# Patient Record
Sex: FEMALE | Race: WHITE | NOT HISPANIC OR LATINO
[De-identification: names, ages, dates, MRNs, and addresses within clinical notes are randomized per-mention and may not be internally consistent; named-entity substitution may affect disease eponyms.]

---

## 2019-03-12 ENCOUNTER — RESULT REVIEW (OUTPATIENT)
Age: 39
End: 2019-03-12

## 2019-03-21 ENCOUNTER — OUTPATIENT (OUTPATIENT)
Dept: OUTPATIENT SERVICES | Facility: HOSPITAL | Age: 39
LOS: 1 days | End: 2019-03-21
Payer: COMMERCIAL

## 2019-03-21 VITALS
SYSTOLIC BLOOD PRESSURE: 128 MMHG | WEIGHT: 147.27 LBS | TEMPERATURE: 98 F | HEIGHT: 66 IN | OXYGEN SATURATION: 99 % | DIASTOLIC BLOOD PRESSURE: 83 MMHG | HEART RATE: 86 BPM | RESPIRATION RATE: 16 BRPM

## 2019-03-21 DIAGNOSIS — Z98.891 HISTORY OF UTERINE SCAR FROM PREVIOUS SURGERY: Chronic | ICD-10-CM

## 2019-03-21 DIAGNOSIS — D24.1 BENIGN NEOPLASM OF RIGHT BREAST: ICD-10-CM

## 2019-03-21 DIAGNOSIS — Z98.890 OTHER SPECIFIED POSTPROCEDURAL STATES: Chronic | ICD-10-CM

## 2019-03-21 DIAGNOSIS — Z01.818 ENCOUNTER FOR OTHER PREPROCEDURAL EXAMINATION: ICD-10-CM

## 2019-03-21 DIAGNOSIS — N63.10 UNSPECIFIED LUMP IN THE RIGHT BREAST, UNSPECIFIED QUADRANT: ICD-10-CM

## 2019-03-21 PROBLEM — Z00.00 ENCOUNTER FOR PREVENTIVE HEALTH EXAMINATION: Status: ACTIVE | Noted: 2019-03-21

## 2019-03-21 LAB
ANION GAP SERPL CALC-SCNC: 7 MMOL/L — SIGNIFICANT CHANGE UP (ref 5–17)
BUN SERPL-MCNC: 11 MG/DL — SIGNIFICANT CHANGE UP (ref 7–23)
CALCIUM SERPL-MCNC: 9.4 MG/DL — SIGNIFICANT CHANGE UP (ref 8.4–10.5)
CHLORIDE SERPL-SCNC: 99 MMOL/L — SIGNIFICANT CHANGE UP (ref 96–108)
CO2 SERPL-SCNC: 24 MMOL/L — SIGNIFICANT CHANGE UP (ref 22–31)
CREAT SERPL-MCNC: 0.94 MG/DL — SIGNIFICANT CHANGE UP (ref 0.5–1.3)
GLUCOSE SERPL-MCNC: 96 MG/DL — SIGNIFICANT CHANGE UP (ref 70–99)
HCG SERPL QL: NEGATIVE — SIGNIFICANT CHANGE UP
HCT VFR BLD CALC: 36.9 % — SIGNIFICANT CHANGE UP (ref 34.5–45)
HGB BLD-MCNC: 12.8 G/DL — SIGNIFICANT CHANGE UP (ref 11.5–15.5)
MCHC RBC-ENTMCNC: 32.5 PG — SIGNIFICANT CHANGE UP (ref 27–34)
MCHC RBC-ENTMCNC: 34.7 GM/DL — SIGNIFICANT CHANGE UP (ref 32–36)
MCV RBC AUTO: 93.6 FL — SIGNIFICANT CHANGE UP (ref 80–100)
PLATELET # BLD AUTO: 420 K/UL — HIGH (ref 150–400)
POTASSIUM SERPL-MCNC: 4.5 MMOL/L — SIGNIFICANT CHANGE UP (ref 3.5–5.3)
POTASSIUM SERPL-SCNC: 4.5 MMOL/L — SIGNIFICANT CHANGE UP (ref 3.5–5.3)
RBC # BLD: 3.94 M/UL — SIGNIFICANT CHANGE UP (ref 3.8–5.2)
RBC # FLD: 11.1 % — SIGNIFICANT CHANGE UP (ref 10.3–14.5)
SODIUM SERPL-SCNC: 130 MMOL/L — LOW (ref 135–145)
WBC # BLD: 6 K/UL — SIGNIFICANT CHANGE UP (ref 3.8–10.5)
WBC # FLD AUTO: 6 K/UL — SIGNIFICANT CHANGE UP (ref 3.8–10.5)

## 2019-03-21 PROCEDURE — 84703 CHORIONIC GONADOTROPIN ASSAY: CPT

## 2019-03-21 PROCEDURE — 93010 ELECTROCARDIOGRAM REPORT: CPT | Mod: NC

## 2019-03-21 PROCEDURE — 85027 COMPLETE CBC AUTOMATED: CPT

## 2019-03-21 PROCEDURE — 93005 ELECTROCARDIOGRAM TRACING: CPT

## 2019-03-21 PROCEDURE — G0463: CPT

## 2019-03-21 PROCEDURE — 36415 COLL VENOUS BLD VENIPUNCTURE: CPT

## 2019-03-21 PROCEDURE — 80048 BASIC METABOLIC PNL TOTAL CA: CPT

## 2019-03-21 NOTE — H&P PST ADULT - ATTENDING COMMENTS
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  She now presents to undergo a right fadia  localization breast biopsy.

## 2019-03-21 NOTE — H&P PST ADULT - RS GEN PE MLT RESP DETAILS PC
no wheezes/breath sounds equal/good air movement/no rhonchi/respirations non-labored/airway patent/clear to auscultation bilaterally/no rales

## 2019-03-21 NOTE — H&P PST ADULT - HISTORY OF PRESENT ILLNESS
39 yo female presents s/p right breast biopsy in February which was benign. Now for a right fadia  localization breast biopsy. Denies breast pain.

## 2019-03-21 NOTE — H&P PST ADULT - NSICDXPASTSURGICALHX_GEN_ALL_CORE_FT
PAST SURGICAL HISTORY:  S/P cervical polypectomy uterine polyp     S/P  section     S/P laparoscopy  for fertility issues

## 2019-03-21 NOTE — H&P PST ADULT - NSANTHOSAYNRD_GEN_A_CORE
No. TAMMI screening performed.  STOP BANG Legend: 0-2 = LOW Risk; 3-4 = INTERMEDIATE Risk; 5-8 = HIGH Risk/neck 12.75 inches

## 2019-03-21 NOTE — H&P PST ADULT - NSICDXFAMILYHX_GEN_ALL_CORE_FT
FAMILY HISTORY:  Father  Still living? Yes, Estimated age: 61-70  FH: type 2 diabetes mellitus, Age at diagnosis: Age Unknown  FHx: hypertension, Age at diagnosis: Age Unknown    Mother  Still living? Yes, Estimated age: 61-70  Family history of autoimmune disorder, Age at diagnosis: Age Unknown  FHx: atrial fibrillation, Age at diagnosis: Age Unknown

## 2019-03-21 NOTE — H&P PST ADULT - NSICDXPASTMEDICALHX_GEN_ALL_CORE_FT
PAST MEDICAL HISTORY:  Abdominal pain GI,  work up has not revealed anything    Anxiety     Breast mass, right     Gastritis     GERD (gastroesophageal reflux disease)     Hernia, hiatal     History of chronic fatigue     History of gluten intolerance     History of right bundle branch block (RBBB)     Microscopic hematuria     Nocturia x2    Sinus infection treated with z pack which was completed 3/19/19    Thyroid disease worked up currently by endocrinologist, ultrasound revealed thyroiditis but blood work was normal

## 2019-03-21 NOTE — H&P PST ADULT - NSICDXPROBLEM_GEN_ALL_CORE_FT
PROBLEM DIAGNOSES  Problem: Mass of breast, right  Assessment and Plan: right fadia  localization. medical clearance requested. ranitidine and nexium AM of surgery with sips of water. May take xanax. Dr. Palleschi's the patient's disc for the FADIA  and it will be uploaded to [facility].

## 2019-03-22 ENCOUNTER — OUTPATIENT (OUTPATIENT)
Dept: OUTPATIENT SERVICES | Facility: HOSPITAL | Age: 39
LOS: 1 days | End: 2019-03-22
Payer: COMMERCIAL

## 2019-03-22 ENCOUNTER — APPOINTMENT (OUTPATIENT)
Dept: ULTRASOUND IMAGING | Facility: IMAGING CENTER | Age: 39
End: 2019-03-22
Payer: COMMERCIAL

## 2019-03-22 DIAGNOSIS — Z98.891 HISTORY OF UTERINE SCAR FROM PREVIOUS SURGERY: Chronic | ICD-10-CM

## 2019-03-22 DIAGNOSIS — Z00.8 ENCOUNTER FOR OTHER GENERAL EXAMINATION: ICD-10-CM

## 2019-03-22 DIAGNOSIS — Z98.890 OTHER SPECIFIED POSTPROCEDURAL STATES: Chronic | ICD-10-CM

## 2019-03-22 PROBLEM — J32.9 CHRONIC SINUSITIS, UNSPECIFIED: Chronic | Status: ACTIVE | Noted: 2019-03-21

## 2019-03-22 PROBLEM — E07.9 DISORDER OF THYROID, UNSPECIFIED: Chronic | Status: ACTIVE | Noted: 2019-03-21

## 2019-03-22 PROBLEM — R35.1 NOCTURIA: Chronic | Status: ACTIVE | Noted: 2019-03-21

## 2019-03-22 PROBLEM — R10.9 UNSPECIFIED ABDOMINAL PAIN: Chronic | Status: ACTIVE | Noted: 2019-03-21

## 2019-03-22 PROCEDURE — C1739: CPT

## 2019-03-22 PROCEDURE — 19285 PERQ DEV BREAST 1ST US IMAG: CPT

## 2019-03-22 PROCEDURE — 19285 PERQ DEV BREAST 1ST US IMAG: CPT | Mod: RT

## 2019-03-26 ENCOUNTER — APPOINTMENT (OUTPATIENT)
Dept: ULTRASOUND IMAGING | Facility: CLINIC | Age: 39
End: 2019-03-26

## 2019-03-27 ENCOUNTER — TRANSCRIPTION ENCOUNTER (OUTPATIENT)
Age: 39
End: 2019-03-27

## 2019-03-28 ENCOUNTER — RESULT REVIEW (OUTPATIENT)
Age: 39
End: 2019-03-28

## 2019-03-28 ENCOUNTER — OUTPATIENT (OUTPATIENT)
Dept: OUTPATIENT SERVICES | Facility: HOSPITAL | Age: 39
LOS: 1 days | End: 2019-03-28
Payer: COMMERCIAL

## 2019-03-28 VITALS
WEIGHT: 147.27 LBS | HEIGHT: 66 IN | RESPIRATION RATE: 18 BRPM | HEART RATE: 75 BPM | DIASTOLIC BLOOD PRESSURE: 77 MMHG | OXYGEN SATURATION: 100 % | TEMPERATURE: 98 F | SYSTOLIC BLOOD PRESSURE: 125 MMHG

## 2019-03-28 VITALS
OXYGEN SATURATION: 100 % | RESPIRATION RATE: 16 BRPM | SYSTOLIC BLOOD PRESSURE: 122 MMHG | DIASTOLIC BLOOD PRESSURE: 75 MMHG | TEMPERATURE: 98 F | HEART RATE: 76 BPM

## 2019-03-28 DIAGNOSIS — Z01.818 ENCOUNTER FOR OTHER PREPROCEDURAL EXAMINATION: ICD-10-CM

## 2019-03-28 DIAGNOSIS — Z98.890 OTHER SPECIFIED POSTPROCEDURAL STATES: Chronic | ICD-10-CM

## 2019-03-28 DIAGNOSIS — Z98.891 HISTORY OF UTERINE SCAR FROM PREVIOUS SURGERY: Chronic | ICD-10-CM

## 2019-03-28 DIAGNOSIS — D24.1 BENIGN NEOPLASM OF RIGHT BREAST: ICD-10-CM

## 2019-03-28 PROCEDURE — 88307 TISSUE EXAM BY PATHOLOGIST: CPT

## 2019-03-28 PROCEDURE — 19125 EXCISION BREAST LESION: CPT | Mod: RT

## 2019-03-28 PROCEDURE — 88307 TISSUE EXAM BY PATHOLOGIST: CPT | Mod: 26

## 2019-03-28 PROCEDURE — ZZZZZ: CPT

## 2019-03-28 PROCEDURE — 76098 X-RAY EXAM SURGICAL SPECIMEN: CPT

## 2019-03-28 PROCEDURE — 76098 X-RAY EXAM SURGICAL SPECIMEN: CPT | Mod: 26

## 2019-03-28 RX ORDER — PREGABALIN 225 MG/1
1 CAPSULE ORAL
Qty: 0 | Refills: 0 | COMMUNITY

## 2019-03-28 RX ORDER — ALPRAZOLAM 0.25 MG
1 TABLET ORAL
Qty: 0 | Refills: 0 | COMMUNITY

## 2019-03-28 RX ORDER — OXYCODONE HYDROCHLORIDE 5 MG/1
5 TABLET ORAL EVERY 4 HOURS
Qty: 0 | Refills: 0 | Status: DISCONTINUED | OUTPATIENT
Start: 2019-03-28 | End: 2019-03-28

## 2019-03-28 RX ORDER — HYDROMORPHONE HYDROCHLORIDE 2 MG/ML
1 INJECTION INTRAMUSCULAR; INTRAVENOUS; SUBCUTANEOUS
Qty: 0 | Refills: 0 | Status: DISCONTINUED | OUTPATIENT
Start: 2019-03-28 | End: 2019-03-28

## 2019-03-28 RX ORDER — OXYBUTYNIN CHLORIDE 5 MG
1 TABLET ORAL
Qty: 0 | Refills: 0 | COMMUNITY

## 2019-03-28 RX ORDER — ESOMEPRAZOLE MAGNESIUM 40 MG/1
1 CAPSULE, DELAYED RELEASE ORAL
Qty: 0 | Refills: 0 | COMMUNITY

## 2019-03-28 RX ORDER — SODIUM CHLORIDE 9 MG/ML
1000 INJECTION, SOLUTION INTRAVENOUS
Qty: 0 | Refills: 0 | Status: DISCONTINUED | OUTPATIENT
Start: 2019-03-28 | End: 2019-03-28

## 2019-03-28 RX ORDER — CHOLECALCIFEROL (VITAMIN D3) 125 MCG
1 CAPSULE ORAL
Qty: 0 | Refills: 0 | COMMUNITY

## 2019-03-28 RX ORDER — ONDANSETRON 8 MG/1
4 TABLET, FILM COATED ORAL EVERY 6 HOURS
Qty: 0 | Refills: 0 | Status: DISCONTINUED | OUTPATIENT
Start: 2019-03-28 | End: 2019-04-12

## 2019-03-28 RX ORDER — RANITIDINE HYDROCHLORIDE 150 MG/1
1 TABLET, FILM COATED ORAL
Qty: 0 | Refills: 0 | COMMUNITY

## 2019-03-28 RX ORDER — ACETAMINOPHEN 500 MG
0 TABLET ORAL
Qty: 0 | Refills: 0 | COMMUNITY

## 2019-03-28 RX ORDER — ONDANSETRON 8 MG/1
4 TABLET, FILM COATED ORAL ONCE
Qty: 0 | Refills: 0 | Status: DISCONTINUED | OUTPATIENT
Start: 2019-03-28 | End: 2019-03-28

## 2019-03-28 RX ORDER — HYDROMORPHONE HYDROCHLORIDE 2 MG/ML
0.5 INJECTION INTRAMUSCULAR; INTRAVENOUS; SUBCUTANEOUS
Qty: 0 | Refills: 0 | Status: DISCONTINUED | OUTPATIENT
Start: 2019-03-28 | End: 2019-03-28

## 2019-03-28 RX ADMIN — SODIUM CHLORIDE 50 MILLILITER(S): 9 INJECTION, SOLUTION INTRAVENOUS at 10:23

## 2019-03-28 RX ADMIN — OXYCODONE HYDROCHLORIDE 5 MILLIGRAM(S): 5 TABLET ORAL at 14:15

## 2019-03-28 RX ADMIN — OXYCODONE HYDROCHLORIDE 5 MILLIGRAM(S): 5 TABLET ORAL at 13:45

## 2019-03-28 NOTE — BRIEF OPERATIVE NOTE - NSICDXBRIEFPROCEDURE_GEN_ALL_CORE_FT
PROCEDURES:  Excisional breast biopsy 28-Mar-2019 12:13:08 right 9:00 with fadia  localization Palleschi, Susan M

## 2019-03-28 NOTE — BRIEF OPERATIVE NOTE - NSICDXBRIEFPREOP_GEN_ALL_CORE_FT
PRE-OP DIAGNOSIS:  Breast mass, right 28-Mar-2019 12:13:57 9:00 with core biopsy fibroepithelial lesion Palleschi, Susan M

## 2019-03-28 NOTE — ASU DISCHARGE PLAN (ADULT/PEDIATRIC) - ASU DC SPECIAL INSTRUCTIONSFT
Follow-up Dr. Palleschi in 10 days; call office for appointment.  Wear compression bra @ all times x 48 hours (including sleep), thereafter for comfort.

## 2019-03-28 NOTE — ASU DISCHARGE PLAN (ADULT/PEDIATRIC) - CALL YOUR DOCTOR IF YOU HAVE ANY OF THE FOLLOWING:
Nausea and vomiting that does not stop/Unable to urinate/Inability to tolerate liquids or foods/Pain not relieved by Medications/Wound/Surgical Site with redness, or foul smelling discharge or pus/Bleeding that does not stop/Fever greater than (need to indicate Fahrenheit or Celsius)

## 2019-03-28 NOTE — ASU PATIENT PROFILE, ADULT - PMH
Abdominal pain  GI,  work up has not revealed anything  Anxiety    Breast mass, right    Gastritis    GERD (gastroesophageal reflux disease)    Hernia, hiatal    History of chronic fatigue    History of gluten intolerance    History of right bundle branch block (RBBB)    Microscopic hematuria    Nocturia  x2  Sinus infection  treated with z pack which was completed 3/19/19  Thyroid disease  worked up currently by endocrinologist, ultrasound revealed thyroiditis but blood work was normal

## 2019-03-28 NOTE — ASU DISCHARGE PLAN (ADULT/PEDIATRIC) - CARE PROVIDER_API CALL
Palleschi, Susan M (MD)  Surgery  [address], [address]  [place] [address]  Phone: [phone]  Fax: [phone]  Follow Up Time:

## 2019-03-28 NOTE — BRIEF OPERATIVE NOTE - NSICDXBRIEFPOSTOP_GEN_ALL_CORE_FT
POST-OP DIAGNOSIS:  Mass of breast, right 28-Mar-2019 12:14:28 9:00 with core biopsy fibroepithelial lesion Palleschi, Susan M

## 2019-04-04 LAB — SURGICAL PATHOLOGY STUDY: SIGNIFICANT CHANGE UP

## 2019-08-23 PROBLEM — Z87.898 PERSONAL HISTORY OF OTHER SPECIFIED CONDITIONS: Chronic | Status: ACTIVE | Noted: 2019-03-21

## 2019-08-23 PROBLEM — Z87.19 PERSONAL HISTORY OF OTHER DISEASES OF THE DIGESTIVE SYSTEM: Chronic | Status: ACTIVE | Noted: 2019-03-21

## 2019-08-23 PROBLEM — K21.9 GASTRO-ESOPHAGEAL REFLUX DISEASE WITHOUT ESOPHAGITIS: Chronic | Status: ACTIVE | Noted: 2019-03-21

## 2019-08-23 PROBLEM — Z86.79 PERSONAL HISTORY OF OTHER DISEASES OF THE CIRCULATORY SYSTEM: Chronic | Status: ACTIVE | Noted: 2019-03-21

## 2019-08-23 PROBLEM — F41.9 ANXIETY DISORDER, UNSPECIFIED: Chronic | Status: ACTIVE | Noted: 2019-03-21

## 2019-08-23 PROBLEM — R31.29 OTHER MICROSCOPIC HEMATURIA: Chronic | Status: ACTIVE | Noted: 2019-03-21

## 2019-08-23 PROBLEM — N63.10 UNSPECIFIED LUMP IN THE RIGHT BREAST, UNSPECIFIED QUADRANT: Chronic | Status: ACTIVE | Noted: 2019-03-21

## 2019-08-23 PROBLEM — K29.70 GASTRITIS, UNSPECIFIED, WITHOUT BLEEDING: Chronic | Status: ACTIVE | Noted: 2019-03-21

## 2019-08-23 PROBLEM — K44.9 DIAPHRAGMATIC HERNIA WITHOUT OBSTRUCTION OR GANGRENE: Chronic | Status: ACTIVE | Noted: 2019-03-21

## 2019-08-29 ENCOUNTER — APPOINTMENT (OUTPATIENT)
Dept: NEUROLOGY | Facility: CLINIC | Age: 39
End: 2019-08-29

## 2020-07-17 ENCOUNTER — APPOINTMENT (OUTPATIENT)
Dept: RHEUMATOLOGY | Facility: CLINIC | Age: 40
End: 2020-07-17

## 2020-07-26 ENCOUNTER — TRANSCRIPTION ENCOUNTER (OUTPATIENT)
Age: 40
End: 2020-07-26

## 2020-08-03 ENCOUNTER — LABORATORY RESULT (OUTPATIENT)
Age: 40
End: 2020-08-03

## 2020-08-03 ENCOUNTER — APPOINTMENT (OUTPATIENT)
Dept: RHEUMATOLOGY | Facility: CLINIC | Age: 40
End: 2020-08-03

## 2020-08-03 ENCOUNTER — APPOINTMENT (OUTPATIENT)
Dept: RHEUMATOLOGY | Facility: CLINIC | Age: 40
End: 2020-08-03
Payer: COMMERCIAL

## 2020-08-03 VITALS
WEIGHT: 152 LBS | OXYGEN SATURATION: 97 % | BODY MASS INDEX: 24.43 KG/M2 | SYSTOLIC BLOOD PRESSURE: 111 MMHG | TEMPERATURE: 98 F | HEIGHT: 66 IN | DIASTOLIC BLOOD PRESSURE: 71 MMHG | HEART RATE: 81 BPM

## 2020-08-03 DIAGNOSIS — R22.1 LOCALIZED SWELLING, MASS AND LUMP, HEAD: ICD-10-CM

## 2020-08-03 DIAGNOSIS — Z86.59 PERSONAL HISTORY OF OTHER MENTAL AND BEHAVIORAL DISORDERS: ICD-10-CM

## 2020-08-03 DIAGNOSIS — R22.0 LOCALIZED SWELLING, MASS AND LUMP, HEAD: ICD-10-CM

## 2020-08-03 DIAGNOSIS — Z78.9 OTHER SPECIFIED HEALTH STATUS: ICD-10-CM

## 2020-08-03 DIAGNOSIS — F17.200 NICOTINE DEPENDENCE, UNSPECIFIED, UNCOMPLICATED: ICD-10-CM

## 2020-08-03 DIAGNOSIS — Z87.39 PERSONAL HISTORY OF OTHER DISEASES OF THE MUSCULOSKELETAL SYSTEM AND CONNECTIVE TISSUE: ICD-10-CM

## 2020-08-03 PROCEDURE — 99205 OFFICE O/P NEW HI 60 MIN: CPT

## 2020-08-04 LAB
25(OH)D3 SERPL-MCNC: 35.7 NG/ML
ALBUMIN SERPL ELPH-MCNC: 5 G/DL
ALP BLD-CCNC: 45 U/L
ALT SERPL-CCNC: 11 U/L
ANA SER IF-ACNC: NEGATIVE
ANION GAP SERPL CALC-SCNC: 16 MMOL/L
AST SERPL-CCNC: 14 U/L
BASOPHILS # BLD AUTO: 0.06 K/UL
BASOPHILS NFR BLD AUTO: 0.7 %
BILIRUB SERPL-MCNC: 0.3 MG/DL
BUN SERPL-MCNC: 11 MG/DL
C3 SERPL-MCNC: 102 MG/DL
C4 SERPL-MCNC: 21 MG/DL
CALCIUM SERPL-MCNC: 9.8 MG/DL
CHLORIDE SERPL-SCNC: 100 MMOL/L
CK SERPL-CCNC: 76 U/L
CO2 SERPL-SCNC: 22 MMOL/L
CREAT SERPL-MCNC: 1.05 MG/DL
CRP SERPL-MCNC: <0.1 MG/DL
DSDNA AB SER-ACNC: <12 IU/ML
ENA RNP AB SER IA-ACNC: 0.2 AL
ENA SM AB SER IA-ACNC: <0.2 AL
ENA SS-A AB SER IA-ACNC: <0.2 AL
ENA SS-B AB SER IA-ACNC: <0.2 AL
EOSINOPHIL # BLD AUTO: 0.08 K/UL
EOSINOPHIL NFR BLD AUTO: 1 %
ERYTHROCYTE [SEDIMENTATION RATE] IN BLOOD BY WESTERGREN METHOD: 6 MM/HR
GLUCOSE SERPL-MCNC: 84 MG/DL
HCT VFR BLD CALC: 37.8 %
HGB BLD-MCNC: 12.3 G/DL
IMM GRANULOCYTES NFR BLD AUTO: 0.1 %
LUPUS ANTICOAGULANT CASCADE REFLEX: NORMAL
LYMPHOCYTES # BLD AUTO: 2.14 K/UL
LYMPHOCYTES NFR BLD AUTO: 25.9 %
MAN DIFF?: NORMAL
MCHC RBC-ENTMCNC: 31.3 PG
MCHC RBC-ENTMCNC: 32.5 GM/DL
MCV RBC AUTO: 96.2 FL
MONOCYTES # BLD AUTO: 0.73 K/UL
MONOCYTES NFR BLD AUTO: 8.8 %
MPO AB + PR3 PNL SER: NORMAL
NEUTROPHILS # BLD AUTO: 5.25 K/UL
NEUTROPHILS NFR BLD AUTO: 63.5 %
PLATELET # BLD AUTO: 437 K/UL
POTASSIUM SERPL-SCNC: 4.8 MMOL/L
PROT SERPL-MCNC: 7.2 G/DL
RBC # BLD: 3.93 M/UL
RBC # FLD: 12.7 %
RIBOSOMAL P AB SER IA-ACNC: <0.2 AL
SODIUM SERPL-SCNC: 138 MMOL/L
TSH SERPL-ACNC: 1.1 UIU/ML
WBC # FLD AUTO: 8.27 K/UL

## 2020-08-05 NOTE — ASSESSMENT
[FreeTextEntry1] : fm tp \par tearful \par dpressed and anxious \par doesn’t go ou tqol \par fhyx ctd\par labs including avise CTD\par pulmonary nodule s- anca, ACE\par \par

## 2020-08-06 LAB
ACE BLD-CCNC: 55 U/L
BAKER'S YEAST AB QL: 15.6 UNITS
BAKER'S YEAST IGA QL IA: <5 UNITS
BAKER'S YEAST IGA QN IA: NEGATIVE
BAKER'S YEAST IGG QN IA: NEGATIVE

## 2020-08-07 PROBLEM — F17.200 CURRENT SMOKER: Status: ACTIVE | Noted: 2020-08-07

## 2020-08-07 PROBLEM — Z86.59 HISTORY OF ANXIETY: Status: RESOLVED | Noted: 2020-08-03 | Resolved: 2020-08-07

## 2020-08-07 PROBLEM — Z87.39 HISTORY OF BACK PAIN: Status: RESOLVED | Noted: 2020-08-03 | Resolved: 2020-08-07

## 2020-08-07 PROBLEM — R22.0 SWELLING, MASS, OR LUMP IN HEAD AND NECK: Status: ACTIVE | Noted: 2020-08-07

## 2020-08-07 PROBLEM — Z78.9 DOES NOT USE ILLICIT DRUGS: Status: ACTIVE | Noted: 2020-08-07

## 2020-08-08 LAB
CELIAC DISEASE INTERPRETATION: NORMAL
CELIAC GENE PAIRS PRESENT: NO
DQ ALPHA 1: NORMAL
DQ BETA 1: NORMAL
HLA-B27 RELATED AG QL: NORMAL
IMMUNOGLOBULIN A (IGA): 147 MG/DL

## 2020-08-12 NOTE — CONSULT LETTER
[Courtesy Letter:] : I had the pleasure of seeing your patient, [unfilled], in my office today. [Dear  ___] : Dear  [unfilled], [Please see my note below.] : Please see my note below. [Consult Closing:] : Thank you very much for allowing me to participate in the care of this patient.  If you have any questions, please do not hesitate to contact me. [Sincerely,] : Sincerely, [FreeTextEntry2] : Dr. Stanford

## 2020-08-12 NOTE — PHYSICAL EXAM
[General Appearance - Alert] : alert [General Appearance - Well Developed] : well developed [General Appearance - Well Nourished] : well nourished [General Appearance - In No Acute Distress] : in no acute distress [General Appearance - Well-Appearing] : healthy appearing [Sclera] : the sclera and conjunctiva were normal [Outer Ear] : the ears and nose were normal in appearance [Oropharynx] : the oropharynx was normal [Neck Cervical Mass (___cm)] : no neck mass was observed [Neck Appearance] : the appearance of the neck was normal [Jugular Venous Distention Increased] : there was no jugular-venous distention [Thyroid Diffuse Enlargement] : the thyroid was not enlarged [Thyroid Nodule] : there were no palpable thyroid nodules [Auscultation Breath Sounds / Voice Sounds] : lungs were clear to auscultation bilaterally [Heart Sounds] : normal S1 and S2 [Heart Rate And Rhythm] : heart rate was normal and rhythm regular [Murmurs] : no murmurs [Heart Sounds Gallop] : no gallops [Heart Sounds Pericardial Friction Rub] : no pericardial rub [Veins - Varicosity Changes] : there were no varicosital changes [Edema] : there was no peripheral edema [Cervical Lymph Nodes Enlarged Posterior Bilaterally] : posterior cervical [Cervical Lymph Nodes Enlarged Anterior Bilaterally] : anterior cervical [Supraclavicular Lymph Nodes Enlarged Bilaterally] : supraclavicular [No CVA Tenderness] : no ~M costovertebral angle tenderness [No Spinal Tenderness] : no spinal tenderness [Nail Clubbing] : no clubbing  or cyanosis of the fingernails [Abnormal Walk] : normal gait [Musculoskeletal - Swelling] : no joint swelling seen [Motor Tone] : muscle strength and tone were normal [Skin Color & Pigmentation] : normal skin color and pigmentation [Skin Turgor] : normal skin turgor [] : no rash [Skin Lesions] : no skin lesions [Motor Exam] : the motor exam was normal [No Focal Deficits] : no focal deficits [Oriented To Time, Place, And Person] : oriented to person, place, and time [Impaired Insight] : insight and judgment were intact [Memory Remote] : remote memory was not impaired [FreeTextEntry1] : mood is depressed, tearful at times, affect down and tearful [Memory Recent] : recent memory was not impaired

## 2020-08-12 NOTE — ASSESSMENT
[FreeTextEntry1] : 38 yo woman PMHX IBS, Depression/Anxiety here with chronic non-inflammatory myalgias and arthralgias, non-restorative sleep, fatigue and chronic pain severely affecting QOL\par \par #Myalgias/arthralgias, chronic non-inflammatory.  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  \par - rec psyche / behavioral health\par \par #neck swelling - no palpable lymph nodes ? muscular\par - US neck\par

## 2020-08-12 NOTE — HISTORY OF PRESENT ILLNESS
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 She was swimming and swallowed water, that did have red (algae?) in it.  Her stomach began to not feel well, developed diarrhea (10x/day) and anxiety.   Since that time she has experienced a number of symptoms and has been to multiple specialists including rheum, gi, infectious without any findings.  \par \par In terms of GI - IBS/HH/GERD.  Celiac workup negative.  IBD negative though one of the markers was positive (ANCA or ASCA) not sure which one.  on nexium.  Has had multiple stool samples and no infections. \par \par In terms of MSK - Hurts all over with associated fatigue and insomnia (sleep study was okay).  Awakens un refreshed.   Pain is all over, though back particularly problematic.  Has herniated disks, now s/p 3 injections.  Hs tight muscles, now s/p multiple course of muscle relaxants. \par - denies swelling, hyperflexibility, heat, redness\par - muscle - low back - upper shoulders, traps, chest wall (CT have been okay) \par - tried elavil - antidepressants - Cymbalta lowest dose completely dizzi and zonked - gluten free diet\par - celiac disease workup negative\par - now on tizanidine - a bit - \par - never on Lyrica or gabapentin\par - was on Savella\par - cant tolerate any of them\par - on and off turmeric + pepper\par - had a PT evaluation - stretches every day - \par - hurt self doing yoga\par - works three days a week - stays home because o f pain \par - nausea in the am \par - denies personal hx psoriasis, infectious diarrhea, STD, IBD \par - HPV 10 years ago - colposcopy \par - occasionally eye dry but make tears - \par   [Malaise] : malaise [Weight Loss] : no weight loss [Fever] : no fever [Depression] : depression [Malar Facial Rash] : no malar facial rash [Oral Ulcers] : no oral ulcers [Dry Mouth] : no dry mouth [Shortness of Breath] : no shortness of breath [Arthralgias] : arthralgias [Difficulty Standing] : no difficulty standing [Eye Redness] : no eye redness [Eye Pain] : no eye pain [Difficulty Walking] : no difficulty walking [Dry Eyes] : no dry eyes [None] : The patient is currently asymptomatic

## 2020-08-12 NOTE — ASSESSMENT
[FreeTextEntry1] : 40 yo woman PMHX IBS, Depression/Anxiety here with chronic non-inflammatory myalgias and arthralgias, non-restorative sleep, fatigue and chronic pain severely affecting QOL\par \par #Myalgias/arthralgias, chronic non-inflammatory.  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  \par - rec psyche / behavioral health\par \par #neck swelling - no palpable lymph nodes ? muscular\par - US neck\par

## 2020-08-12 NOTE — HISTORY OF PRESENT ILLNESS
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 She was swimming and swallowed water, that did have red (algae?) in it.  Her stomach began to not feel well, developed diarrhea (10x/day) and anxiety.   Since that time she has experienced a number of symptoms and has been to multiple specialists including rheum, gi, infectious without any findings.  \par \par In terms of GI - IBS/HH/GERD.  Celiac workup negative.  IBD negative though one of the markers was positive (ANCA or ASCA) not sure which one.  on nexium.  Has had multiple stool samples and no infections. \par \par In terms of MSK - Hurts all over with associated fatigue and insomnia (sleep study was okay).  Awakens un refreshed.   Pain is all over, though back particularly problematic.  Has herniated disks, now s/p 3 injections.  Hs tight muscles, now s/p multiple course of muscle relaxants. \par - denies swelling, hyperflexibility, heat, redness\par - muscle - low back - upper shoulders, traps, chest wall (CT have been okay) \par - tried elavil - antidepressants - Cymbalta lowest dose completely dizzi and zonked - gluten free diet\par - celiac disease workup negative\par - now on tizanidine - a bit - \par - never on Lyrica or gabapentin\par - was on Savella\par - cant tolerate any of them\par - on and off turmeric + pepper\par - had a PT evaluation - stretches every day - \par - hurt self doing yoga\par - works three days a week - stays home because o f pain \par - nausea in the am \par - denies personal hx psoriasis, infectious diarrhea, STD, IBD \par - HPV 10 years ago - colposcopy \par - occasionally eye dry but make tears - \par   [Anorexia] : no anorexia [Malaise] : malaise [Weight Loss] : no weight loss [Fever] : no fever [Depression] : depression [Oral Ulcers] : no oral ulcers [Malar Facial Rash] : no malar facial rash [Dry Mouth] : no dry mouth [Shortness of Breath] : no shortness of breath [Arthralgias] : arthralgias [Difficulty Standing] : no difficulty standing [Eye Redness] : no eye redness [Eye Pain] : no eye pain [Difficulty Walking] : no difficulty walking [Dry Eyes] : no dry eyes [None] : The patient is currently asymptomatic

## 2020-08-12 NOTE — REVIEW OF SYSTEMS
[Fever] : no fever [Feeling Poorly] : feeling poorly [Feeling Tired] : feeling tired [As Noted in HPI] : as noted in HPI [Negative] : Endocrine

## 2020-08-12 NOTE — PHYSICAL EXAM
[General Appearance - Alert] : alert [General Appearance - Well Nourished] : well nourished [General Appearance - Well Developed] : well developed [General Appearance - In No Acute Distress] : in no acute distress [General Appearance - Well-Appearing] : healthy appearing [Sclera] : the sclera and conjunctiva were normal [Oropharynx] : the oropharynx was normal [Outer Ear] : the ears and nose were normal in appearance [Neck Cervical Mass (___cm)] : no neck mass was observed [Neck Appearance] : the appearance of the neck was normal [Jugular Venous Distention Increased] : there was no jugular-venous distention [Thyroid Diffuse Enlargement] : the thyroid was not enlarged [Thyroid Nodule] : there were no palpable thyroid nodules [Auscultation Breath Sounds / Voice Sounds] : lungs were clear to auscultation bilaterally [Heart Rate And Rhythm] : heart rate was normal and rhythm regular [Heart Sounds] : normal S1 and S2 [Heart Sounds Gallop] : no gallops [Murmurs] : no murmurs [Heart Sounds Pericardial Friction Rub] : no pericardial rub [Veins - Varicosity Changes] : there were no varicosital changes [Edema] : there was no peripheral edema [Cervical Lymph Nodes Enlarged Posterior Bilaterally] : posterior cervical [Cervical Lymph Nodes Enlarged Anterior Bilaterally] : anterior cervical [Supraclavicular Lymph Nodes Enlarged Bilaterally] : supraclavicular [No CVA Tenderness] : no ~M costovertebral angle tenderness [No Spinal Tenderness] : no spinal tenderness [Abnormal Walk] : normal gait [Nail Clubbing] : no clubbing  or cyanosis of the fingernails [Musculoskeletal - Swelling] : no joint swelling seen [Motor Tone] : muscle strength and tone were normal [Skin Color & Pigmentation] : normal skin color and pigmentation [Skin Turgor] : normal skin turgor [] : no rash [Skin Lesions] : no skin lesions [Motor Exam] : the motor exam was normal [No Focal Deficits] : no focal deficits [Oriented To Time, Place, And Person] : oriented to person, place, and time [Impaired Insight] : insight and judgment were intact [Memory Remote] : remote memory was not impaired [Memory Recent] : recent memory was not impaired [FreeTextEntry1] : mood is depressed, tearful at times, affect down and tearful

## 2020-08-12 NOTE — CONSULT LETTER
[Dear  ___] : Dear  [unfilled], [Courtesy Letter:] : I had the pleasure of seeing your patient, [unfilled], in my office today. [Please see my note below.] : Please see my note below. [Consult Closing:] : Thank you very much for allowing me to participate in the care of this patient.  If you have any questions, please do not hesitate to contact me. [Sincerely,] : Sincerely, [FreeTextEntry2] : Dr. Stanford

## 2020-08-14 LAB
MISCELLANEOUS TEST: NORMAL
PROC NAME: NORMAL

## 2020-09-01 ENCOUNTER — APPOINTMENT (OUTPATIENT)
Dept: RHEUMATOLOGY | Facility: CLINIC | Age: 40
End: 2020-09-01
Payer: COMMERCIAL

## 2020-09-01 VITALS
BODY MASS INDEX: 24.75 KG/M2 | SYSTOLIC BLOOD PRESSURE: 124 MMHG | OXYGEN SATURATION: 96 % | WEIGHT: 154 LBS | HEART RATE: 87 BPM | HEIGHT: 66 IN | DIASTOLIC BLOOD PRESSURE: 85 MMHG

## 2020-09-01 LAB
CA VI IGA AB: 4.4 EU/ML
CA VI IGG AB: 6.4 EU/ML
CA VI IGM AB: 2.3 EU/ML
PSP IGA AB: 8.2 EU/ML
PSP IGG AB: 12.3 EU/ML
PSP IGM AB: 1.5 EU/ML
SEROLOGY COMMENTS: NORMAL
SP-1 IGA AB: 3.5 EU/ML
SP-1 IGG AB: 11.4 EU/ML
SP-1 IGM AB: 2.2 EU/ML

## 2020-09-01 PROCEDURE — 99214 OFFICE O/P EST MOD 30 MIN: CPT

## 2020-09-01 NOTE — REVIEW OF SYSTEMS
[Feeling Poorly] : feeling poorly [Feeling Tired] : feeling tired [As Noted in HPI] : as noted in HPI [Negative] : Endocrine [Fever] : no fever

## 2020-09-01 NOTE — HISTORY OF PRESENT ILLNESS
[Malaise] : malaise [Depression] : depression [Arthralgias] : arthralgias [None] : The patient is currently asymptomatic [FreeTextEntry1] : INTERVAL HX\par - still in chronic pain, not sleeping, tearful, lots of stomach issues\par - here to review labs [Anorexia] : no anorexia [Weight Loss] : no weight loss [Fever] : no fever [Malar Facial Rash] : no malar facial rash [Oral Ulcers] : no oral ulcers [Dry Mouth] : no dry mouth [Shortness of Breath] : no shortness of breath [Difficulty Standing] : no difficulty standing [Difficulty Walking] : no difficulty walking [Eye Pain] : no eye pain [Eye Redness] : no eye redness [Dry Eyes] : no dry eyes

## 2020-09-01 NOTE — ASSESSMENT
[FreeTextEntry1] : 38 yo woman PMHX IBS, Depression/Anxiety here with chronic non-inflammatory myalgias and arthralgias, non-restorative sleep, fatigue and chronic pain severely affecting QOL\par \par #Myalgias/arthralgias, chronic non-inflammatory.  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 will revisit in 3 week s- if empiric trial helpful will think about steroid sparing agent, if not will refer to mervin preston for 2nd opinion for centralized pain\par \par #Depression/anxiety.  \par - rec psyche / behavioral health\par \par #neck swelling - no palpable lymph nodes ? muscular\par - US neck\par

## 2020-09-01 NOTE — PHYSICAL EXAM
[General Appearance - Alert] : alert [General Appearance - In No Acute Distress] : in no acute distress [General Appearance - Well Nourished] : well nourished [General Appearance - Well Developed] : well developed [General Appearance - Well-Appearing] : healthy appearing [Sclera] : the sclera and conjunctiva were normal [Thyroid Diffuse Enlargement] : the thyroid was not enlarged [Abnormal Walk] : normal gait [Nail Clubbing] : no clubbing  or cyanosis of the fingernails [Musculoskeletal - Swelling] : no joint swelling seen [Motor Tone] : muscle strength and tone were normal [Skin Color & Pigmentation] : normal skin color and pigmentation [Skin Turgor] : normal skin turgor [] : no rash [Skin Lesions] : no skin lesions [Motor Exam] : the motor exam was normal [No Focal Deficits] : no focal deficits [Oriented To Time, Place, And Person] : oriented to person, place, and time [Impaired Insight] : insight and judgment were intact [Memory Recent] : recent memory was not impaired [Memory Remote] : remote memory was not impaired [Respiration, Rhythm And Depth] : normal respiratory rhythm and effort [FreeTextEntry1] : mood is depressed, tearful at times, affect down and tearful

## 2020-09-02 ENCOUNTER — APPOINTMENT (OUTPATIENT)
Dept: RHEUMATOLOGY | Facility: CLINIC | Age: 40
End: 2020-09-02

## 2020-09-29 ENCOUNTER — APPOINTMENT (OUTPATIENT)
Dept: RHEUMATOLOGY | Facility: CLINIC | Age: 40
End: 2020-09-29
Payer: COMMERCIAL

## 2020-09-29 VITALS
SYSTOLIC BLOOD PRESSURE: 109 MMHG | HEIGHT: 66 IN | DIASTOLIC BLOOD PRESSURE: 74 MMHG | HEART RATE: 71 BPM | BODY MASS INDEX: 24.75 KG/M2 | WEIGHT: 154 LBS | OXYGEN SATURATION: 97 %

## 2020-09-29 DIAGNOSIS — M62.89 OTHER SPECIFIED DISORDERS OF MUSCLE: ICD-10-CM

## 2020-09-29 PROCEDURE — 99213 OFFICE O/P EST LOW 20 MIN: CPT

## 2020-10-01 PROBLEM — M62.89 MUSCLE TIGHTNESS: Status: ACTIVE | Noted: 2020-08-03

## 2020-10-01 NOTE — HISTORY OF PRESENT ILLNESS
[Malaise] : malaise [Depression] : depression [Arthralgias] : arthralgias [None] : The patient is currently asymptomatic [FreeTextEntry1] : INTERVAL HX\par - still in chronic pain, not sleeping, tearful, lots of stomach issues\par - did the steroid trial - and received absolutely no relief.   in fact didn’t like the way steroids made her feel, but pushed through them to see if it would help at all.   [Anorexia] : no anorexia [Weight Loss] : no weight loss [Fever] : no fever [Malar Facial Rash] : no malar facial rash [Oral Ulcers] : no oral ulcers [Dry Mouth] : no dry mouth [Shortness of Breath] : no shortness of breath [Difficulty Standing] : no difficulty standing [Difficulty Walking] : no difficulty walking [Eye Pain] : no eye pain [Eye Redness] : no eye redness [Dry Eyes] : no dry eyes

## 2020-10-01 NOTE — PHYSICAL EXAM
[General Appearance - Alert] : alert [General Appearance - In No Acute Distress] : in no acute distress [General Appearance - Well Nourished] : well nourished [General Appearance - Well Developed] : well developed [General Appearance - Well-Appearing] : healthy appearing [Sclera] : the sclera and conjunctiva were normal [Respiration, Rhythm And Depth] : normal respiratory rhythm and effort [Abnormal Walk] : normal gait [Skin Color & Pigmentation] : normal skin color and pigmentation [] : no rash [Motor Exam] : the motor exam was normal [No Focal Deficits] : no focal deficits [Oriented To Time, Place, And Person] : oriented to person, place, and time [Impaired Insight] : insight and judgment were intact [Memory Recent] : recent memory was not impaired [Memory Remote] : remote memory was not impaired [Oropharynx] : the oropharynx was normal [Neck Appearance] : the appearance of the neck was normal [FreeTextEntry1] : mood is depressed, tearful at times, affect down and tearful

## 2020-10-01 NOTE — ASSESSMENT
[FreeTextEntry1] : 40 yo woman PMHX IBS, Depression/Anxiety here with chronic non-inflammatory myalgias and arthralgias, non-restorative sleep, fatigue and chronic pain severely affecting QOL\par \par #Myalgias/arthralgias, chronic non-inflammatory.  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  \par \par #Depression/anxiety.  \par - rec psyche / behavioral health - currently reluctant to go down that path\par

## 2020-11-02 ENCOUNTER — NON-APPOINTMENT (OUTPATIENT)
Age: 40
End: 2020-11-02

## 2020-11-04 ENCOUNTER — APPOINTMENT (OUTPATIENT)
Dept: RHEUMATOLOGY | Facility: CLINIC | Age: 40
End: 2020-11-04
Payer: COMMERCIAL

## 2020-11-04 VITALS
TEMPERATURE: 98.1 F | DIASTOLIC BLOOD PRESSURE: 74 MMHG | OXYGEN SATURATION: 97 % | HEIGHT: 66 IN | RESPIRATION RATE: 16 BRPM | SYSTOLIC BLOOD PRESSURE: 111 MMHG | HEART RATE: 72 BPM

## 2020-11-04 DIAGNOSIS — M25.50 PAIN IN UNSPECIFIED JOINT: ICD-10-CM

## 2020-11-04 PROCEDURE — 99215 OFFICE O/P EST HI 40 MIN: CPT | Mod: 25

## 2020-11-04 PROCEDURE — 99072 ADDL SUPL MATRL&STAF TM PHE: CPT

## 2020-11-09 RX ORDER — TIZANIDINE 4 MG/1
4 TABLET ORAL
Qty: 60 | Refills: 2 | Status: ACTIVE | COMMUNITY
Start: 2020-11-09

## 2020-11-09 RX ORDER — PREDNISONE 5 MG/1
5 TABLET ORAL
Qty: 60 | Refills: 0 | Status: DISCONTINUED | COMMUNITY
Start: 2020-09-01 | End: 2020-11-09

## 2021-03-31 ENCOUNTER — APPOINTMENT (OUTPATIENT)
Dept: RHEUMATOLOGY | Facility: CLINIC | Age: 41
End: 2021-03-31
Payer: COMMERCIAL

## 2021-03-31 DIAGNOSIS — G47.00 INSOMNIA, UNSPECIFIED: ICD-10-CM

## 2021-03-31 DIAGNOSIS — M79.7 FIBROMYALGIA: ICD-10-CM

## 2021-03-31 DIAGNOSIS — F41.8 OTHER SPECIFIED ANXIETY DISORDERS: ICD-10-CM

## 2021-03-31 DIAGNOSIS — R53.83 OTHER FATIGUE: ICD-10-CM

## 2021-03-31 PROCEDURE — 99214 OFFICE O/P EST MOD 30 MIN: CPT | Mod: 95

## 2021-03-31 RX ORDER — OXYCODONE AND ACETAMINOPHEN 10; 325 MG/1; MG/1
10-325 TABLET ORAL
Qty: 180 | Refills: 0 | Status: DISCONTINUED | COMMUNITY
Start: 2020-11-09 | End: 2021-03-31

## 2021-03-31 RX ORDER — BACLOFEN 5 MG/1
5 TABLET ORAL
Qty: 120 | Refills: 2 | Status: DISCONTINUED | COMMUNITY
Start: 2020-11-09 | End: 2021-03-31

## 2021-03-31 NOTE — PHYSICAL EXAM
[Neck Appearance] : the appearance of the neck was normal [] : no respiratory distress [Respiration, Rhythm And Depth] : normal respiratory rhythm and effort [Abdomen Mass (___ Cm)] : no abdominal mass palpated [No Focal Deficits] : no focal deficits [FreeTextEntry1] : multiple moderate trigger points in the occipital and trapezial areas w/ limited ROM throughout neck \par

## 2021-03-31 NOTE — HISTORY OF PRESENT ILLNESS
[Home] : at home, [unfilled] , at the time of the visit. [Medical Office: ([facility])___] : at the medical office located in  [Verbal consent obtained from patient] : the patient, [unfilled] [Malaise] : malaise [Depression] : depression [Arthralgias] : arthralgias [None] : The patient is currently asymptomatic [FreeTextEntry1] : INTERVAL HX\par - still in chronic pain, not sleeping, tearful, lots of stomach issues\par - did the steroid trial - and received absolutely no relief.   in fact didn’t like the way steroids made her feel, but pushed through them to see if it would help at all.   [Anorexia] : no anorexia [Weight Loss] : no weight loss [Fever] : no fever [Malar Facial Rash] : no malar facial rash [Oral Ulcers] : no oral ulcers [Dry Mouth] : no dry mouth [Shortness of Breath] : no shortness of breath [Difficulty Standing] : no difficulty standing [Difficulty Walking] : no difficulty walking [Eye Pain] : no eye pain [Eye Redness] : no eye redness [Dry Eyes] : no dry eyes

## 2021-03-31 NOTE — REVIEW OF SYSTEMS
[Feeling Tired] : feeling tired [Sleep Disturbances] : sleep disturbances [Anxiety] : anxiety [Depression] : depression [As Noted in HPI] : as noted in HPI [Negative] : Endocrine [Fever] : no fever [Feeling Poorly] : not feeling poorly [de-identified] : PHQ/ JOSI scores high last visit - severe- little change

## 2021-03-31 NOTE — ASSESSMENT
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 Iniitally started as severe IBS w/ chronic nausea and dx w/ FM associated w/ IC, IBS, mood disorder severe anxiety/ depression at times, TMJ and chronic myofascial pain on long term opiate tx.. \par \par 1)FM: certainly   meets 1990, 2010/11 and now 2016 ACR/ EULAR criteria for FM not currently well controlled, though able to work/ care for child, minimal QOL otherwise... over ys progressive worsening, requiring long term opiates for past couple of years... with some but incomplete control- initially excellent but over past yr, less improvement and more SE.. \par Tapered self off opiates because wants to try LDN.. will start at 0.5 mg and titrate as needed and tolerated to 4 mg daily.. \par Upon initial FM consult 11/20 .  Initial PSD 20 today w/ JOSI also severe .. \par - Sleep:  chronic fatigue, NRS for many ys, sleep latency up to 90 mins using chronic benzodiazepine and notes need for higher doses over time.. now at 2.0 mg alprazolam only at HS, nothing in daytime and actually sleeping slightly better... though fatigue persists.  Brief trial gabapentin titrated to 300 mg not tolerated and not effective.  \par REmains vERy . nervous about any new medications.. "very sensitive".. \par - Chronic GI issues:  confirmed IBS, currently well controlled.. hx of hiatal hernia/ GERD and gastric ulcer.. not active issue at this point despite use of opiates.. \par - Mood:  considerable issue severe depression/ anxiety at this point.. NOT well controlled JOSI 19, PHQ 22.. symptoms persist with little change from initial visit.. doesn't want to consider any tx for mood at this point after failing to tolerate multiple therapies in past. \par - IC like symptoms.. urinary frequency, trace hematuria.. cystoscopies neg.. last done 2019\par - TMJ intermittent, bruxism... supposed to wear mouth guard, hasn't recently, needs to be addressed to help sleep\par - LBP/ Cervicalgia:  worse past few mnhts off oxycodone... not c/w tizanidine / or baclofen.. trying to minimize.   \par NOTE: \par - comprehensive rheum eval negative 2020 w/ NO response to steroids   \par Tx: \par Gabapentin nOT tolerated or effective to 300 mg \par Chronic opiates for several ys now on oxycodone 10 mg TID.. routinely - OFF 3/21 (not much different)\par ZIYAD + response w/ resolution of radicular sx previously very uncomfortable\par Routinely works with chiropractor- some but incomplete response\par Muscle relaxers:  tizanidine at HS and Baclofen daytime.. previous use of flexeril and skelaxin w/out benefit\par Biofeedback\par Acupuncture\par NSAIDs:  only moderate benefit and fairly tolerated. Ibuprofen OTC PRN.. \par Cymbalta severe abd pain at lowest dose.. nausea profound.. \par Amitriptyline "not tolerated" but can't say what\par Sertraline + response in past at low dose\par Paxil: ? and many more but would need to call PCP to discuss, pt can't remember \par \par Discussion: Lengthy discussion today about need to address sleep and mood before likely to have tremendous success. She's convinced depression / anxiety are because of pain. Wants to try Naltrexone.. very slow titration to desired/ recommended dose 4 mg.    Without addressing mood/ sleep..  unlikely to manage pain.  Will also need routine exercise/ myofascial release, recommend SM techniques for myofascial pain.  Has had poor tolerance for many medications, will likely need psychotherapy for chronic pain- CBT ideally... and if depression/ anxiety don't improve with addressing sleep, will need to see psychiatrist as well.  \par \par 2) Chronic Rx: opiates and benzodiazepines. Tapered off opiates 3/21..  and little difference overall, maybe some more back pain but not considerably different.  s requesting LD naltrexone.. but can't consider this while on opiates... will start now at 0.5 mg daily increasing as tolerated to 4 mg   \par Should be getting benzodiazepines from psychiatrist ideally (not PCP).. .  \par \par Plan: \par - off gabapentin, won't retry \par \par - SM for myofascial release needed .. may need PT.. if unable to develop strong daily exercise regimen will need \par \par - likely needs antidepressant but not willing to consider at this time.. will address in future.. currently denies SI/ SA.. despite severe anxiety/ depression\par \par Exercise: should start... something consistent  - still recommended.. is walking more should increase by 50%\par - discussed importance, value of starting / continuing slow progressive exercise regimen w/ goal of 30-45 min daily at least 5 days per week\par - start at 50% of what you think you can do but start somewhere and increase VERY slowly... \par - recommend activity tracker for monitoring activity/ pacing and sleep quality\par \par Sleep: \par - attention to quality sleep essential.\par - behavioral strategies "Dos & Don'ts" provided with attention to \par minimizing screen time before bed \par minimizing daytime sleep w/ naps not longer than 30-60 mins and not after 3 pm\par relaxing night time ritual\par consistent sleep/ wake times \par bed reserved for sex and sleep only\par ideally 10-15 mins outdoor time daily \par exercise as above \par \par - start Naltrexone 0.5 mg and increase as tolerated and necessary to 4 mg call if not tolerated \par \par - rto 3 m\par \par

## 2022-11-11 ENCOUNTER — APPOINTMENT (OUTPATIENT)
Dept: RHEUMATOLOGY | Facility: CLINIC | Age: 42
End: 2022-11-11

## 2023-06-22 NOTE — ASU PREOP CHECKLIST - TYPE OF SOLUTION
Pre-Op call completed.   Medications list reviewed and updated as needed.  Instructed patient on medications to take day of surgery-SEE MEDICATION LIST.    Patient instructed to arrive for surgery at 0830 on 6-29-23 at [facility].    Pre-op instructions reviewed, verbalized understanding.  Questions answered.  Call back number of [phone] given in case other concerns or questions arise.    Patient instructed to bring in current list of medications (name of medication, dose and frequency of daily use) day of surgery.     Pre-op Teaching Completed:    OR - Procedure, OR - Time and time of arrival, Location of Registration, NPO, Medications to take Day of Surgery, Skin Prep, Discharge Policy: Ride/Responsibile Adult and Bowel Prep      Patient instructed to notify surgeon if patient has or develops any fever, cold or flu like symptoms, other signs of general illness, or any exposure to COVID19.  Patient also notified of current hospital visitor restrictions and hospital entrance screening (if any).        
Ringer Lactate

## 2023-11-20 ENCOUNTER — APPOINTMENT (OUTPATIENT)
Dept: NEUROLOGY | Facility: CLINIC | Age: 43
End: 2023-11-20
Payer: COMMERCIAL

## 2023-11-20 ENCOUNTER — NON-APPOINTMENT (OUTPATIENT)
Age: 43
End: 2023-11-20

## 2023-11-20 VITALS
HEART RATE: 92 BPM | DIASTOLIC BLOOD PRESSURE: 79 MMHG | HEIGHT: 66 IN | WEIGHT: 154 LBS | SYSTOLIC BLOOD PRESSURE: 117 MMHG | BODY MASS INDEX: 24.75 KG/M2

## 2023-11-20 DIAGNOSIS — G89.29 OTHER CHRONIC PAIN: ICD-10-CM

## 2023-11-20 PROCEDURE — 99205 OFFICE O/P NEW HI 60 MIN: CPT

## 2024-01-05 ENCOUNTER — APPOINTMENT (OUTPATIENT)
Dept: NEUROLOGY | Facility: CLINIC | Age: 44
End: 2024-01-05
Payer: COMMERCIAL

## 2024-01-05 VITALS
HEART RATE: 89 BPM | HEIGHT: 66 IN | SYSTOLIC BLOOD PRESSURE: 116 MMHG | OXYGEN SATURATION: 98 % | WEIGHT: 167 LBS | BODY MASS INDEX: 26.84 KG/M2 | DIASTOLIC BLOOD PRESSURE: 80 MMHG | TEMPERATURE: 98.3 F

## 2024-01-05 DIAGNOSIS — G62.9 POLYNEUROPATHY, UNSPECIFIED: ICD-10-CM

## 2024-01-05 PROCEDURE — 95886 MUSC TEST DONE W/N TEST COMP: CPT

## 2024-01-05 PROCEDURE — 95911 NRV CNDJ TEST 9-10 STUDIES: CPT

## 2024-01-05 RX ORDER — ALPRAZOLAM 1 MG/1
1 TABLET ORAL
Qty: 21 | Refills: 0 | Status: COMPLETED | COMMUNITY
Start: 2020-11-09 | End: 2024-01-05

## 2024-01-05 RX ORDER — METHYLPREDNISOLONE 4 MG/1
4 TABLET ORAL
Qty: 1 | Refills: 0 | Status: COMPLETED | COMMUNITY
Start: 2023-11-20 | End: 2024-01-05

## 2024-01-05 RX ORDER — BACLOFEN 10 MG/1
10 TABLET ORAL
Qty: 60 | Refills: 0 | Status: COMPLETED | COMMUNITY
Start: 2021-02-04 | End: 2024-01-05

## 2024-01-05 RX ORDER — TRAZODONE HYDROCHLORIDE 100 MG/1
100 TABLET ORAL
Refills: 0 | Status: ACTIVE | COMMUNITY

## 2024-01-05 RX ORDER — NALTREXONE HCL 100 %
POWDER (GRAM) MISCELLANEOUS
Qty: 28 | Refills: 2 | Status: COMPLETED | COMMUNITY
Start: 2021-03-31 | End: 2024-01-05

## 2024-02-05 ENCOUNTER — APPOINTMENT (OUTPATIENT)
Dept: NEUROLOGY | Facility: CLINIC | Age: 44
End: 2024-02-05
Payer: COMMERCIAL

## 2024-02-05 VITALS
DIASTOLIC BLOOD PRESSURE: 92 MMHG | SYSTOLIC BLOOD PRESSURE: 147 MMHG | WEIGHT: 167 LBS | HEIGHT: 66 IN | HEART RATE: 76 BPM | BODY MASS INDEX: 26.84 KG/M2

## 2024-02-05 PROCEDURE — 99205 OFFICE O/P NEW HI 60 MIN: CPT

## 2024-02-05 PROCEDURE — 99215 OFFICE O/P EST HI 40 MIN: CPT

## 2024-02-05 RX ORDER — GABAPENTIN 100 MG/1
100 CAPSULE ORAL 3 TIMES DAILY
Qty: 540 | Refills: 5 | Status: COMPLETED | COMMUNITY
Start: 2020-11-04 | End: 2024-02-05

## 2024-02-05 RX ORDER — TRAMADOL HYDROCHLORIDE 50 MG/1
50 TABLET, COATED ORAL DAILY
Qty: 20 | Refills: 0 | Status: ACTIVE | COMMUNITY
Start: 2024-02-05 | End: 1900-01-01

## 2024-02-05 RX ORDER — CYCLOBENZAPRINE HYDROCHLORIDE 5 MG/1
5 TABLET, FILM COATED ORAL
Qty: 30 | Refills: 0 | Status: ACTIVE | COMMUNITY
Start: 2024-02-05 | End: 1900-01-01

## 2024-02-06 NOTE — REVIEW OF SYSTEMS
[Recent Weight Gain (___ Lbs)] : recent [unfilled] ~Ulb weight gain [As Noted in HPI] : as noted in HPI [Negative] : Integumentary

## 2024-02-06 NOTE — PHYSICAL EXAM
[General Appearance - Alert] : alert [General Appearance - In No Acute Distress] : in no acute distress [Oriented To Time, Place, And Person] : oriented to person, place, and time [Impaired Insight] : insight and judgment were intact [Affect] : the affect was normal [Person] : oriented to person [Place] : oriented to place [Time] : oriented to time [Fluency] : fluency intact [Comprehension] : comprehension intact [Past History] : adequate knowledge of personal past history [Cranial Nerves Optic (II)] : visual acuity intact bilaterally,  visual fields full to confrontation, pupils equal round and reactive to light [Cranial Nerves Oculomotor (III)] : extraocular motion intact [Cranial Nerves Trigeminal (V)] : facial sensation intact symmetrically [Cranial Nerves Facial (VII)] : face symmetrical [Cranial Nerves Vestibulocochlear (VIII)] : hearing was intact bilaterally [Cranial Nerves Glossopharyngeal (IX)] : tongue and palate midline [Cranial Nerves Accessory (XI - Cranial And Spinal)] : head turning and shoulder shrug symmetric [Cranial Nerves Hypoglossal (XII)] : there was no tongue deviation with protrusion [Motor Tone] : muscle tone was normal in all four extremities [Motor Strength] : muscle strength was normal in all four extremities [Involuntary Movements] : no involuntary movements were seen [Sensation Tactile Decrease] : light touch was intact [No Muscle Atrophy] : normal bulk in all four extremities [Sensation Pain / Temperature Decrease] : pain and temperature was intact [Sensation Vibration Decrease] : vibration was intact [Proprioception] : proprioception was intact [Abnormal Walk] : normal gait [Balance] : balance was intact [Past-pointing] : there was no past-pointing [Tremor] : no tremor present [Dysdiadochokinesia Bilaterally] : not present [Plantar Reflex Right Only] : normal on the right [2+] : Ankle jerk left 2+ [Plantar Reflex Left Only] : normal on the left

## 2024-02-06 NOTE — DISCUSSION/SUMMARY
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  EMG showed no large fiber polyneuropathy.  I think she may have small fiber neuropathy. She had extensive work up by rheumatologist and Dr. Sosa, all negative so far. She has PMH of sarcoidosis diagnosed by biopsy. I do not have that record. She had tried multiple medications, gabapentin (gained 30 pounds), cymbalta or other antidepressant (dizzy, N/V, other GI SE), NSAIDs (does not work). Plan  will do skin biopsy for small fiber neuropathy tramadol 50 mg prn for pain. All side effects were discussed, including but not limited to addiction, N/V, CNS suppression. She has trazodone medication on file but she said she does not have it no more. I firmly told her that do not take it together as side effect can be fatal from CNS suppression. She voiced understanding. will see her back after skin biopsy 3 weeks to discuss result.   I spent the time noted on the day of this patient encounter preparing for, providing and documenting the above E/M service and counseling and educate patient on differential, workup, disease course, and treatment/management. Education was provided to the patient during this encounter. All questions and concerns were answered and addressed in detail. The patient verbalized understanding and agreed to plan. Patient was advised to continue to monitor for neurologic symptoms and to notify my office or go to the nearest emergency room if there are any changes. Any orders/referrals and communications were provided as well. side effects of the above medications were discussed in detail including but not limited to applicable black box warning and teratogenicity as appropriate.  Patient was advised to bring previous records to my office.

## 2024-02-20 ENCOUNTER — APPOINTMENT (OUTPATIENT)
Dept: NEUROLOGY | Facility: CLINIC | Age: 44
End: 2024-02-20

## 2024-03-11 ENCOUNTER — APPOINTMENT (OUTPATIENT)
Dept: NEUROLOGY | Facility: CLINIC | Age: 44
End: 2024-03-11

## 2024-03-27 PROBLEM — G62.9 SMALL FIBER NEUROPATHY: Status: ACTIVE | Noted: 2024-02-05
